# Patient Record
Sex: MALE | URBAN - METROPOLITAN AREA
[De-identification: names, ages, dates, MRNs, and addresses within clinical notes are randomized per-mention and may not be internally consistent; named-entity substitution may affect disease eponyms.]

---
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
## 2023-11-29 ENCOUNTER — OFFICE VISIT (OUTPATIENT)
Dept: URGENT CARE | Facility: CLINIC | Age: 18
End: 2023-11-29
Payer: COMMERCIAL

## 2023-11-29 VITALS
OXYGEN SATURATION: 97 % | RESPIRATION RATE: 16 BRPM | TEMPERATURE: 98.4 F | DIASTOLIC BLOOD PRESSURE: 62 MMHG | HEART RATE: 100 BPM | SYSTOLIC BLOOD PRESSURE: 124 MMHG

## 2023-11-29 DIAGNOSIS — Z01.89 VISIT FOR LABORATORY TEST: Primary | ICD-10-CM

## 2023-11-29 PROCEDURE — 99202 OFFICE O/P NEW SF 15 MIN: CPT | Mod: NC | Performed by: NURSE PRACTITIONER

## 2023-11-29 NOTE — PROGRESS NOTES
Subjective      Chief Complaint   Patient presents with    Sickle cell testing     Pt plans to be the [employer] and needs testing prior to clearance. Had sports physical done 1 week ago back home in [place]         HPI    Leon Terrell is a 17 y.o. male who presents for labs.  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  States he had his sports physical performed by his primary care provider back home in [place], [place], but states that he did not have the required sickle cell blood test ordered.  Patient denies any personal or family history of bleeding or clotting disorders.  He states he is quite healthy and denies any chronic medical issues. He denies any recent acute illness.  He does not take any medications on a daily basis.  Denies drug or alcohol use and does not smoke or vape.      The following have been reviewed and updated as appropriate in this visit:   Allergies         No Known Allergies  No current outpatient medications on file.     No current facility-administered medications for this visit.     No past medical history on file.  No past surgical history on file.  No family history on file.  Social History     Socioeconomic History    Marital status: Single       Review of Systems  Please see [facility]     Objective     Vitals:  /62 (BP Location: Left arm, Patient Position: Sitting, Cuff Size: Regular Adult)   Pulse 100   Temp 36.9 °C (98.4 °F)   Resp 16   SpO2 97%     Physical Exam  Vitals and nursing note reviewed.   Constitutional:       General: He is not in acute distress.     Appearance: Normal appearance. He is normal weight. He is not ill-appearing, toxic-appearing or diaphoretic.   HENT:      Head: Normocephalic and atraumatic.   Cardiovascular:      Rate and Rhythm: Normal rate and regular rhythm.      Pulses: Normal pulses.      Heart sounds: Normal heart sounds. No murmur heard.  Pulmonary:      Effort:  Pulmonary effort is normal. No respiratory distress.      Breath sounds: Normal breath sounds. No stridor. No wheezing, rhonchi or rales.   Chest:      Chest wall: No tenderness.   Skin:     General: Skin is warm and dry.      Capillary Refill: Capillary refill takes less than 2 seconds.   Neurological:      General: No focal deficit present.      Mental Status: He is alert and oriented to person, place, and time. Mental status is at baseline.   Psychiatric:         Mood and Affect: Mood normal.         Behavior: Behavior normal.         Thought Content: Thought content normal.         No results found for this or any previous visit (from the past 4 hour(s)).      Assessment/Plan   Diagnoses and all orders for this visit:    Visit for laboratory test  -     Sickle cell screen (HGB S) Blood, Venous        Discussion:    Patient's vital signs are stable at this time.  He is afebrile, normotensive, no acute distress.  I will place an order for outpatient labs to be performed at [facility] urgent care for sickle cell screening.  We will call him with results once available interpreted.  Advised that patient may also access his labs and visit note/information on [facility] patient portal [employer].  If he has any additional questions concerns or needs, recommend he follow back up in clinic setting or give us a call.  Patient states understanding of information and instructions, agrees to plan of care at this time, all questions answered.    Patient Education: Ready to learn, no apparent learning barriers were identified; learning preference includes listening.  Explained diagnosis and treatment plan; patient/caregiver expressed understanding of the content. All questions answered.         No follow-ups on file.  Michelle Locke, CNP

## 2023-11-29 NOTE — PATIENT INSTRUCTIONS
Present to [facility] (either on [facility] or [facility]) to have bloodwork performed.     We will call you with results once available and interpreted  - You may also access your health information and results on [email or web address] (patient portal)    Call or come back in for any questions or concerns.

## 2023-12-01 ENCOUNTER — LAB (OUTPATIENT)
Dept: LAB | Facility: URGENT CARE | Age: 18
End: 2023-12-01
Payer: COMMERCIAL

## 2023-12-01 PROCEDURE — 85660 RBC SICKLE CELL TEST: CPT | Performed by: NURSE PRACTITIONER

## 2023-12-01 PROCEDURE — 36415 COLL VENOUS BLD VENIPUNCTURE: CPT | Performed by: NURSE PRACTITIONER

## 2023-12-04 LAB — HGB S BLD QL SOLY: NEGATIVE

## 2024-01-31 ENCOUNTER — OFFICE VISIT (OUTPATIENT)
Dept: URGENT CARE | Facility: CLINIC | Age: 19
End: 2024-01-31
Payer: COMMERCIAL

## 2024-01-31 VITALS
BODY MASS INDEX: 20.65 KG/M2 | SYSTOLIC BLOOD PRESSURE: 122 MMHG | HEIGHT: 73 IN | WEIGHT: 155.8 LBS | DIASTOLIC BLOOD PRESSURE: 65 MMHG | TEMPERATURE: 97.8 F | HEART RATE: 87 BPM | OXYGEN SATURATION: 95 %

## 2024-01-31 DIAGNOSIS — S16.1XXA STRAIN OF NECK MUSCLE, INITIAL ENCOUNTER: Primary | ICD-10-CM

## 2024-01-31 PROCEDURE — 99213 OFFICE O/P EST LOW 20 MIN: CPT | Mod: NC | Performed by: NURSE PRACTITIONER

## 2024-01-31 NOTE — PROGRESS NOTES
"Subjective      Chief Complaint   Patient presents with    Neck Pain       HPI    Leon Terrell is a 18 y.o. male who presents for evaluation of posterior neck pain.  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  He states later in the evening he started to experience some posterior neck pain ranging from the base of his head down the length of the neck and cervical spine.  He denies any acute strain or fall/traumatic injury, no blunt force trauma or loss of consciousness.  He states over the past 24 hours he has had some worsening tightness and pain where he has pain with looking up and down.  He denies any sensation of crepitus.  No swelling, ecchymosis, or skin changes.  Denies headaches, visual disturbances, numbness or tingling or weakness to extremities.  He took some ibuprofen 2 nights ago, but has not take anything since.      The following have been reviewed and updated as appropriate in this visit:          No Known Allergies  No current outpatient medications on file.     No current facility-administered medications for this visit.     No past medical history on file.  No past surgical history on file.  No family history on file.  Social History     Socioeconomic History    Marital status: Single       Review of Systems  Please see HPI      Objective     Vitals:  /65 (BP Location: Left arm, Patient Position: Sitting)   Pulse 87   Temp 36.6 °C (97.8 °F) (Temporal)   Ht 1.854 m (6' 1\")   Wt 70.7 kg (155 lb 12.8 oz)   SpO2 95%   BMI 20.56 kg/m²     Physical Exam  Vitals and nursing note reviewed.   Constitutional:       General: He is not in acute distress.     Appearance: Normal appearance. He is normal weight. He is not ill-appearing or toxic-appearing.   HENT:      Head: Normocephalic and atraumatic.   Neck:      Thyroid: No thyroid mass.      Vascular: No carotid bruit. "     Cardiovascular:      Pulses: Normal pulses.      Heart sounds: Normal heart sounds.   Pulmonary:      Effort: Pulmonary effort is normal.      Breath sounds: Normal breath sounds.   Musculoskeletal:      Cervical back: Normal range of motion. Tenderness present. No edema, erythema, signs of trauma, rigidity, torticollis or crepitus. Pain with movement (Pain with flexion and extension of cervical spine. Patient has full AROM wthout creiptus or limitation) and muscular tenderness (To palpation along the medial cervical paraspinal musculature.) present. No spinous process tenderness. Normal range of motion.   Lymphadenopathy:      Cervical: No cervical adenopathy.   Skin:     General: Skin is warm and dry.      Capillary Refill: Capillary refill takes less than 2 seconds.   Neurological:      General: No focal deficit present.      Mental Status: He is alert and oriented to person, place, and time.   Psychiatric:         Mood and Affect: Mood normal.         Behavior: Behavior normal.         No results found for this or any previous visit (from the past 4 hour(s)).        Assessment/Plan   Diagnoses and all orders for this visit:    Strain of neck muscle, initial encounter        Discussion:    Patient's vital signs are stable at this time.  He is afebrile, normotensive, no acute distress.  Reviewed with patient that symptoms and assessment findings are consistent with an acute cervical muscular strain from physical activity and gymnastics.  Patient has not had no blunt force trauma, no head injury or loss of consciousness.  Given the location of his pain symptoms, we discussed ordering C-spine x-ray imaging to further evaluate, however, patient declines at this time.  As he is otherwise stable and exam is quite benign we will proceed accordingly.  I recommend patient proceed with conservative measures to include NSAIDs, muscle relaxant, ice/heat, topical pain relievers and gentle stretching.  Patient declines  muscle relaxant, but states he will try some ibuprofen,   Heat/ice, and gentle stretching, handout provided to him.  Advised patient to avoid any strenuous physical activity or gymnastics moves like back flips/other high intensity moves until his symptoms have improved.  He declines need for note at this time.  Advised patient to follow-up in clinic in the next couple of weeks sooner if symptoms worsen or fail to improve.  Patient verbalizes understanding of information instructions, agrees with plan of care at this time, questions answered.    Patient Education: Ready to learn, no apparent learning barriers were identified; learning preference includes listening.  Explained diagnosis and treatment plan; patient/caregiver expressed understanding of the content. All questions answered.       No follow-ups on file.  Michelle Locke, CNP

## 2024-01-31 NOTE — PATIENT INSTRUCTIONS
Continue with supportive cares and therapies.  Neck pain:  - May use ibuprofen 600 mg (3 tablets) 3 times a day for the next 3 to 5 days.  - May continue with Tylenol 2 if needed.  - May use some gentle stretching techniques, see handout for details.  - May apply heat/ice to the affected area for 15 minutes at a time 4-5 times per day.  You can may alternate the two.    If your symptoms worsen or fail to improve, follow up for re-evaluation and consideration for imaging, additional treatments.